# Patient Record
Sex: MALE
[De-identification: names, ages, dates, MRNs, and addresses within clinical notes are randomized per-mention and may not be internally consistent; named-entity substitution may affect disease eponyms.]

---

## 2023-02-17 PROBLEM — Z00.00 ENCOUNTER FOR PREVENTIVE HEALTH EXAMINATION: Status: ACTIVE | Noted: 2023-02-17

## 2023-02-18 ENCOUNTER — APPOINTMENT (OUTPATIENT)
Dept: MRI IMAGING | Facility: CLINIC | Age: 63
End: 2023-02-18
Payer: COMMERCIAL

## 2023-02-18 PROCEDURE — 72148 MRI LUMBAR SPINE W/O DYE: CPT

## 2023-02-20 ENCOUNTER — TRANSCRIPTION ENCOUNTER (OUTPATIENT)
Age: 63
End: 2023-02-20

## 2024-05-03 ENCOUNTER — NON-APPOINTMENT (OUTPATIENT)
Age: 64
End: 2024-05-03

## 2024-05-09 ENCOUNTER — APPOINTMENT (OUTPATIENT)
Dept: UROLOGY | Facility: CLINIC | Age: 64
End: 2024-05-09
Payer: COMMERCIAL

## 2024-05-09 VITALS
HEART RATE: 62 BPM | DIASTOLIC BLOOD PRESSURE: 82 MMHG | BODY MASS INDEX: 24.44 KG/M2 | OXYGEN SATURATION: 98 % | WEIGHT: 165 LBS | SYSTOLIC BLOOD PRESSURE: 131 MMHG | HEIGHT: 69 IN

## 2024-05-09 DIAGNOSIS — R97.20 ELEVATED PROSTATE, SPECIFIC ANTIGEN [PSA]: ICD-10-CM

## 2024-05-09 DIAGNOSIS — Z78.9 OTHER SPECIFIED HEALTH STATUS: ICD-10-CM

## 2024-05-09 PROCEDURE — 99204 OFFICE O/P NEW MOD 45 MIN: CPT

## 2024-05-09 NOTE — HISTORY OF PRESENT ILLNESS
[FreeTextEntry1] : Mr. CAPELLAN is a 63 year-year-old  Declined  M who comes today to clinic for elevated PSA to 5.7 ng/mL.  He does have a 3 Melisa prostate MRI identifying his 0.8 and 0.5 transition zone lesions, PI-RADS 3 and 2 respectively.  Patient has previously seen urologist at [facility] who recommended a fusion biopsy.  Patient is here for second opinion.  Patient to send read and images of MRI to our office for review. No family history of Prostate Cancer. Denies LUTS, fevers, chills, flank pain, hematuria, AUR.

## 2024-05-09 NOTE — SIGNATURES
[TextEntry] : Jorge Hyde M.D. Minimally Invasive and Robotic Urologic Surgery [facility] | [facility]  of Urology [employer] at [facility] Tel: [phone] | Fax: [phone] | email: [email or web address]

## 2024-05-09 NOTE — ASSESSMENT
[FreeTextEntry1] : Mr. CAPELLAN is a 63 year-year-old  Declined  M who comes today to clinic for elevated PSA to 5.7 ng/mL.  He does have a 3 Melisa prostate MRI identifying his 0.8 and 0.5 transition zone lesions, PI-RADS 3 and 2 respectively.  Patient has previously seen urologist at [facility] who recommended a fusion biopsy.  Patient is here for second opinion.  Patient to send read and images of MRI to our office for review.    The patient understands that PSA is a marker of cancer risk but does not diagnose cancer. He also understands that there are a number of benign conditions including UTI, BPH, certain activities and prostatitis that will increase PSA in the absence of cancer. Unfortunately the only way to definitively diagnose cancer is with a prostate biopsy. We discussed the method of performing prostate biopsy and the risks (bleeding, infection, urinary retention, ED). In addition to this, the patient and I discussed the discrepancy between the prevalence of prostate cancer and the prevalence of death from prostate cancer.  Prostate cancer is the most common solid tumor in American men. However, we discussed that it can be very slow growing, many men with prostate cancer will die with the disease rather than from it. Based on our discussion the patient decided to obtain new PSA in 3 months.   The PI-RADS system categorizes prostate lesions based on the likelihood of cancer according to a five-point scale, defined as the following:   PI-RADS 1  Clinically significant cancer is highly unlikely to be present. PI-RADS 2  Clinically significant cancer is unlikely to be present. PI-RADS 3  The presence of clinically significant cancer is equivocal. PI-RADS 4  Clinically significant cancer is likely to be present. PI-RADS 5  Clinically significant cancer is highly likely to be present. The probability of finding a clinically significant cancer in males with PI-RADS 3, 4, or 5 lesions was 12 and 23 percent, 60 and 49 percent, and 83 and 77 percent.   I gave the patient the following options: 1. Do nothing. This could miss an undiagnosed prostate cancer which may have little effect or significant effects at a later date. 2. Recheck his PSA in 3-6 months. We would then regroup and have this discussion about his options again. 3. Perform a prostate biopsy: I explained how a Transperineal targeted prostate biopsy is an office-based procedure (or at an outpatient facility) with local anesthesia. It is performed under TRUS and magnetic resonance imaging (MRI) guidance. Complications include but are not limited to hematuria, hematospermia, perineal hematoma, prostatitis, difficulty voiding.

## 2024-05-10 LAB
APPEARANCE: CLEAR
BACTERIA: NEGATIVE /HPF
BILIRUBIN URINE: NEGATIVE
BLOOD URINE: NEGATIVE
CAST: 0 /LPF
COLOR: YELLOW
EPITHELIAL CELLS: 0 /HPF
GLUCOSE QUALITATIVE U: NEGATIVE MG/DL
KETONES URINE: NEGATIVE MG/DL
LEUKOCYTE ESTERASE URINE: NEGATIVE
MICROSCOPIC-UA: NORMAL
NITRITE URINE: NEGATIVE
PH URINE: 6.5
PROTEIN URINE: NEGATIVE MG/DL
PSA FREE FLD-MCNC: 16 %
PSA FREE SERPL-MCNC: 0.98 NG/ML
PSA SERPL-MCNC: 5.98 NG/ML
RED BLOOD CELLS URINE: 0 /HPF
SPECIFIC GRAVITY URINE: 1.01
UROBILINOGEN URINE: 0.2 MG/DL
WHITE BLOOD CELLS URINE: 0 /HPF

## 2024-05-13 LAB — BACTERIA UR CULT: NORMAL

## 2024-05-14 LAB — URINE CYTOLOGY: NORMAL

## 2024-05-23 ENCOUNTER — APPOINTMENT (OUTPATIENT)
Dept: MRI IMAGING | Facility: CLINIC | Age: 64
End: 2024-05-23

## 2024-07-30 ENCOUNTER — APPOINTMENT (OUTPATIENT)
Dept: OTOLARYNGOLOGY | Facility: CLINIC | Age: 64
End: 2024-07-30
Payer: COMMERCIAL

## 2024-07-30 ENCOUNTER — NON-APPOINTMENT (OUTPATIENT)
Age: 64
End: 2024-07-30

## 2024-07-30 VITALS
DIASTOLIC BLOOD PRESSURE: 69 MMHG | WEIGHT: 165 LBS | HEART RATE: 67 BPM | HEIGHT: 69 IN | SYSTOLIC BLOOD PRESSURE: 107 MMHG | BODY MASS INDEX: 24.44 KG/M2

## 2024-07-30 DIAGNOSIS — H60.502 UNSPECIFIED ACUTE NONINFECTIVE OTITIS EXTERNA, LEFT EAR: ICD-10-CM

## 2024-07-30 DIAGNOSIS — Z77.22 CONTACT WITH AND (SUSPECTED) EXPOSURE TO ENVIRONMENTAL TOBACCO SMOKE (ACUTE) (CHRONIC): ICD-10-CM

## 2024-07-30 DIAGNOSIS — Z85.828 PERSONAL HISTORY OF OTHER MALIGNANT NEOPLASM OF SKIN: ICD-10-CM

## 2024-07-30 DIAGNOSIS — H61.22 IMPACTED CERUMEN, LEFT EAR: ICD-10-CM

## 2024-07-30 PROCEDURE — 99203 OFFICE O/P NEW LOW 30 MIN: CPT | Mod: 25

## 2024-07-30 PROCEDURE — 69210 REMOVE IMPACTED EAR WAX UNI: CPT

## 2024-07-30 RX ORDER — CIPROFLOXACIN AND DEXAMETHASONE 3; 1 MG/ML; MG/ML
0.3-0.1 SUSPENSION/ DROPS AURICULAR (OTIC) TWICE DAILY
Qty: 1 | Refills: 0 | Status: ACTIVE | COMMUNITY
Start: 2024-07-30 | End: 1900-01-01

## 2024-07-30 NOTE — PHYSICAL EXAM
[de-identified] : Left cerumen impaction. Left EAC with erythema and edema. Right EAC wnl. [Midline] : trachea located in midline position [Normal] : no rashes

## 2024-07-30 NOTE — ASSESSMENT
[FreeTextEntry1] : Henrry Newman presents for evaluation of left otalgia and diminshed hearing. On exam, he has left cerumen impaction which was removed and left otitis externa. He notes return of hearing to baseline after cerumen removal.  - Start ciprodex - 5 drops BID to left ear for 7 days. - dry ear precautions for 1 week. - f/u prn.

## 2024-07-30 NOTE — REVIEW OF SYSTEMS
[Seasonal Allergies] : seasonal allergies [Ear Pain] : ear pain [Ear Itch] : ear itch [Hearing Loss] : hearing loss [Ear Drainage] : ear drainage [Ear Noises] : ear noises [Sinus Pain] : sinus pain [Sinus Pressure] : sinus pressure [Swelling Neck] : swelling neck [Swelling Face] : face swelling [Negative] : Heme/Lymph

## 2024-07-30 NOTE — HISTORY OF PRESENT ILLNESS
[de-identified] : Henrry Newman is a 64 yo male who presents for evaluation of left otalgia. He states taht he was in [place] last week, and was in the pool and ocean. He developed left otalgia and swelling. He notes diminished left sided hearing. He denies otorrhea, tinnitus, or vertigo. He denies recent fevers or chills. He denies history of recurrent ear infections.

## 2024-08-08 ENCOUNTER — APPOINTMENT (OUTPATIENT)
Dept: UROLOGY | Facility: CLINIC | Age: 64
End: 2024-08-08

## 2024-08-08 PROCEDURE — 99214 OFFICE O/P EST MOD 30 MIN: CPT

## 2024-08-08 NOTE — ASSESSMENT
[FreeTextEntry1] : Mr. CAPELLAN is a 63 year-year-old  Declined  M who comes today to clinic for elevated PSA to 5.9 ng/mL.  He does have a 3 Melisa prostate MRI identifying his 0.8 and 0.5 transition zone lesions, PI-RADS 3 and 2 respectively. dPSA:20.  Discussed possibility of biopsy.  Patient would like to avoid procedures as much as possible.  Discussed with patient need for biopsy if uptrending PSA.     The patient understands that PSA is a marker of cancer risk but does not diagnose cancer. He also understands that there are a number of benign conditions including UTI, BPH, certain activities and prostatitis that will increase PSA in the absence of cancer. Unfortunately the only way to definitively diagnose cancer is with a prostate biopsy. We discussed the method of performing prostate biopsy and the risks (bleeding, infection, urinary retention, ED). In addition to this, the patient and I discussed the discrepancy between the prevalence of prostate cancer and the prevalence of death from prostate cancer.  Prostate cancer is the most common solid tumor in American men. However, we discussed that it can be very slow growing, many men with prostate cancer will die with the disease rather than from it. Based on our discussion the patient decided to obtain new PSA in 3 months.   The PI-RADS system categorizes prostate lesions based on the likelihood of cancer according to a five-point scale, defined as the following:   PI-RADS 1  Clinically significant cancer is highly unlikely to be present. PI-RADS 2  Clinically significant cancer is unlikely to be present. PI-RADS 3  The presence of clinically significant cancer is equivocal. PI-RADS 4  Clinically significant cancer is likely to be present. PI-RADS 5  Clinically significant cancer is highly likely to be present. The probability of finding a clinically significant cancer in males with PI-RADS 3, 4, or 5 lesions was 12 and 23 percent, 60 and 49 percent, and 83 and 77 percent.   I gave the patient the following options: 1. Do nothing. This could miss an undiagnosed prostate cancer which may have little effect or significant effects at a later date. 2. Recheck his PSA in 3-6 months. We would then regroup and have this discussion about his options again. 3. Perform a prostate biopsy: I explained how a Transperineal targeted prostate biopsy is an office-based procedure (or at an outpatient facility) with local anesthesia. It is performed under TRUS and magnetic resonance imaging (MRI) guidance. Complications include but are not limited to hematuria, hematospermia, perineal hematoma, prostatitis, difficulty voiding.

## 2024-08-08 NOTE — SIGNATURES
[TextEntry] : Jorge Hyde M.D. Minimally Invasive and Robotic Urologic Surgery [facility] | [facility]  of Urology [employer] at [facility] Tel: [phone] | Fax: [phone] | email: [email or web address]

## 2024-08-08 NOTE — SIGNATURES
[TextEntry] : Jorge Hyde M.D. Minimally Invasive and Robotic Urologic Surgery [facility] | [facility]  of Urology [employer] at [facility] Tel: [phone] | Fax: [phone] | email: [email or web address]

## 2024-08-08 NOTE — HISTORY OF PRESENT ILLNESS
[FreeTextEntry1] : Mr. CAPELLAN is a 63 year-year-old  Declined  M who comes today to clinic for elevated PSA to 5.7 ng/mL.  He does have a 3 Melisa prostate MRI identifying his 0.8 and 0.5 transition zone lesions, PI-RADS 3 and 2 respectively.  Patient has previously seen urologist at [facility] who recommended a fusion biopsy.  Patient is here for second opinion.  Patient to send read and images of MRI to our office for review. No family history of Prostate Cancer.  8/8/2024 Patient here for new PSA. PSA history: 5.7 April 2024 5.98 May 2024 Denies LUTS, fevers, chills, flank pain, hematuria, AUR.

## 2024-08-08 NOTE — HISTORY OF PRESENT ILLNESS
[FreeTextEntry1] : Mr. CAPELLAN is a 63 year-year-old  Declined  M who comes today to clinic for elevated PSA to 5.7 ng/mL.  He does have a 3 Melisa prostate MRI identifying his 0.8 and 0.5 transition zone lesions, PI-RADS 3 and 2 respectively.  Patient has previously seen urologist at [facility] who recommended a fusion biopsy.  Patient is here for second opinion.  Patient to send read and images of MRI to our office for review. No family history of Prostate Cancer.  8/8/2024 Patient here for new PSA. PSA history: 5.7 April 2024 5.98 May 2024 Denies LUTS, fevers, chills, flank pain, hematuria, AUR.

## 2024-08-13 ENCOUNTER — TRANSCRIPTION ENCOUNTER (OUTPATIENT)
Age: 64
End: 2024-08-13

## 2024-08-13 ENCOUNTER — NON-APPOINTMENT (OUTPATIENT)
Age: 64
End: 2024-08-13

## 2024-09-03 ENCOUNTER — APPOINTMENT (OUTPATIENT)
Dept: UROLOGY | Facility: CLINIC | Age: 64
End: 2024-09-03
Payer: COMMERCIAL

## 2024-09-03 VITALS — SYSTOLIC BLOOD PRESSURE: 143 MMHG | HEART RATE: 67 BPM | OXYGEN SATURATION: 99 % | DIASTOLIC BLOOD PRESSURE: 87 MMHG

## 2024-09-03 DIAGNOSIS — R97.20 ELEVATED PROSTATE, SPECIFIC ANTIGEN [PSA]: ICD-10-CM

## 2024-09-03 PROCEDURE — 99214 OFFICE O/P EST MOD 30 MIN: CPT

## 2024-09-03 NOTE — PHYSICAL EXAM
[General Appearance - Well Developed] : well developed [General Appearance - Well Nourished] : well nourished [] : no respiratory distress [Abdomen Soft] : soft [No Focal Deficits] : no focal deficits [Oriented To Time, Place, And Person] : oriented to person, place, and time [de-identified] : pt deferred

## 2024-09-03 NOTE — HISTORY OF PRESENT ILLNESS
[FreeTextEntry1] : Pt here for pre op counseling for prostate bx.  [Urinary Incontinence] : no urinary incontinence [Urinary Retention] : no urinary retention [Urinary Urgency] : no urinary urgency [Urinary Frequency] : no urinary frequency [None] : None

## 2024-09-03 NOTE — ASSESSMENT
[FreeTextEntry1] : 1.  Elevated PSA w/ PIRAD 3 lesions - Reviewed risks and benefits of fusion transperineal targeted prostate biopsy with local anesthesia/ sedation. It is performed under TRUS and magnetic resonance imaging (MRI) guidance. Complications include but are not limited to hematuria, hematospermia, perineal hematoma, prostatitis, difficulty voiding,etc. pt wishes to proceed.

## 2024-10-24 ENCOUNTER — APPOINTMENT (OUTPATIENT)
Dept: UROLOGY | Facility: HOSPITAL | Age: 64
End: 2024-10-24

## 2024-11-07 ENCOUNTER — APPOINTMENT (OUTPATIENT)
Dept: UROLOGY | Facility: CLINIC | Age: 64
End: 2024-11-07

## 2024-11-19 ENCOUNTER — NON-APPOINTMENT (OUTPATIENT)
Age: 64
End: 2024-11-19

## 2024-11-19 ENCOUNTER — APPOINTMENT (OUTPATIENT)
Dept: UROLOGY | Facility: CLINIC | Age: 64
End: 2024-11-19
Payer: COMMERCIAL

## 2024-11-19 VITALS
HEIGHT: 69 IN | RESPIRATION RATE: 17 BRPM | SYSTOLIC BLOOD PRESSURE: 126 MMHG | BODY MASS INDEX: 25.18 KG/M2 | TEMPERATURE: 98.1 F | HEART RATE: 69 BPM | DIASTOLIC BLOOD PRESSURE: 73 MMHG | WEIGHT: 170 LBS

## 2024-11-19 PROCEDURE — 99214 OFFICE O/P EST MOD 30 MIN: CPT

## 2024-11-20 ENCOUNTER — APPOINTMENT (OUTPATIENT)
Dept: UROLOGY | Facility: CLINIC | Age: 64
End: 2024-11-20
Payer: COMMERCIAL

## 2024-11-20 VITALS
RESPIRATION RATE: 16 BRPM | OXYGEN SATURATION: 98 % | WEIGHT: 170 LBS | HEART RATE: 63 BPM | BODY MASS INDEX: 25.18 KG/M2 | HEIGHT: 69 IN | DIASTOLIC BLOOD PRESSURE: 78 MMHG | SYSTOLIC BLOOD PRESSURE: 157 MMHG

## 2024-11-20 DIAGNOSIS — C61 MALIGNANT NEOPLASM OF PROSTATE: ICD-10-CM

## 2024-11-20 PROCEDURE — 99214 OFFICE O/P EST MOD 30 MIN: CPT

## 2024-12-03 ENCOUNTER — APPOINTMENT (OUTPATIENT)
Dept: NUCLEAR MEDICINE | Facility: CLINIC | Age: 64
End: 2024-12-03